# Patient Record
Sex: FEMALE | Race: WHITE | Employment: UNEMPLOYED | URBAN - METROPOLITAN AREA
[De-identification: names, ages, dates, MRNs, and addresses within clinical notes are randomized per-mention and may not be internally consistent; named-entity substitution may affect disease eponyms.]

---

## 2018-02-05 ENCOUNTER — OFFICE VISIT (OUTPATIENT)
Dept: FAMILY MEDICINE CLINIC | Age: 53
End: 2018-02-05

## 2018-02-05 VITALS
HEART RATE: 74 BPM | TEMPERATURE: 98.2 F | DIASTOLIC BLOOD PRESSURE: 89 MMHG | WEIGHT: 156 LBS | HEIGHT: 63 IN | BODY MASS INDEX: 27.64 KG/M2 | SYSTOLIC BLOOD PRESSURE: 139 MMHG

## 2018-02-05 DIAGNOSIS — Z12.11 SCREENING FOR COLON CANCER: ICD-10-CM

## 2018-02-05 DIAGNOSIS — Z23 NEED FOR TDAP VACCINATION: ICD-10-CM

## 2018-02-05 DIAGNOSIS — Z12.31 ENCOUNTER FOR SCREENING MAMMOGRAM FOR BREAST CANCER: ICD-10-CM

## 2018-02-05 DIAGNOSIS — R55 VASOVAGAL NEAR-SYNCOPE: Primary | ICD-10-CM

## 2018-02-05 PROCEDURE — 99213 OFFICE O/P EST LOW 20 MIN: CPT | Performed by: NURSE PRACTITIONER

## 2018-02-05 PROCEDURE — 90471 IMMUNIZATION ADMIN: CPT | Performed by: NURSE PRACTITIONER

## 2018-02-05 PROCEDURE — 90715 TDAP VACCINE 7 YRS/> IM: CPT | Performed by: NURSE PRACTITIONER

## 2018-02-05 ASSESSMENT — PATIENT HEALTH QUESTIONNAIRE - PHQ9
2. FEELING DOWN, DEPRESSED OR HOPELESS: 0
SUM OF ALL RESPONSES TO PHQ QUESTIONS 1-9: 0
1. LITTLE INTEREST OR PLEASURE IN DOING THINGS: 0
SUM OF ALL RESPONSES TO PHQ9 QUESTIONS 1 & 2: 0

## 2018-02-05 ASSESSMENT — ENCOUNTER SYMPTOMS: RESPIRATORY NEGATIVE: 1

## 2018-02-05 NOTE — PROGRESS NOTES
Subjective:      Chief Complaint   Patient presents with    Established New Doctor    Other     needs order for Colonoscopy       Patient ID: Kamila Black is a 50 y.o. female who presents to be estabilished as a new pt. Denies any problems other than an ED visit 12/6/ for a vasovagal syncopal event. Patient was on her period when she experienced severe cramping and bleeding she felt like she was going to have a bowel movement when she called out to her  before she passed out. She tells me she was out she could hear people talking to her but she could not respond to them. Loss of consciousness was less than a minute states her  she has denied any history of other syncopal events before that time denies any heart disease or anemia. The ED decided that she had experienced a vasovagal syncopal event associated with menses and defecation. She was discharged home in stable condition. She states her periods today are heavier and have more pain she believes it is due to her lack of exercise. She is now continuing her exercise program and feels this will help her menses. Loss of Consciousness   This is a new problem. The current episode started more than 1 month ago. The problem occurs rarely. The problem has been resolved. She lost consciousness for a period of less than 1 minute. The symptoms are aggravated by defecation. Associated symptoms include headaches (History of migraines takes Excedrinmigraine). Treatments tried: to the ED. The treatment provided significant relief. There is no history of arrhythmia, CAD, a clotting disorder, CVA, DM, HTN, seizures, a sudden death in family, TIA or vertigo.        Family History   Problem Relation Age of Onset    High Blood Pressure Mother     Arthritis Mother     Cancer Maternal Grandmother      colon       Social History     Social History    Marital status:      Spouse name: N/A    Number of children: N/A    Years of education: N/A Occupational History    Not on file. Social History Main Topics    Smoking status: Never Smoker    Smokeless tobacco: Never Used    Alcohol use No      Comment: a couple times a month    Drug use: No    Sexual activity: Yes     Other Topics Concern    Not on file     Social History Narrative    No narrative on file       Current Outpatient Prescriptions on File Prior to Visit   Medication Sig Dispense Refill    aspirin-acetaminophen-caffeine (EXCEDRIN MIGRAINE) 250-250-65 MG per tablet Take 1 tablet by mouth every 6 hours as needed for Headaches       No current facility-administered medications on file prior to visit. Review of Systems   Respiratory: Negative. Cardiovascular: Positive for syncope. Genitourinary:        Patient still on. Which are heavier and more painful   Musculoskeletal: Negative. Neurological: Positive for headaches (History of migraines takes Excedrinmigraine). Near syncopal event, vasovagal syncope she experienced this only once   Psychiatric/Behavioral: Negative. Objective:     Physical Exam   Constitutional: She is oriented to person, place, and time. She appears well-developed and well-nourished. HENT:   Head: Normocephalic and atraumatic. Right Ear: External ear normal.   Left Ear: External ear normal.   Nose: Nose normal.   Mouth/Throat: Oropharynx is clear and moist.   Eyes: Conjunctivae and EOM are normal. Pupils are equal, round, and reactive to light. Neck: Normal range of motion. Neck supple. Cardiovascular: Regular rhythm and normal heart sounds. Exam reveals no gallop and no friction rub. No murmur heard. Pulmonary/Chest: Effort normal and breath sounds normal. She has no wheezes. She has no rales. Abdominal: Soft. Bowel sounds are normal.   Musculoskeletal: Normal range of motion. Neurological: She is alert and oriented to person, place, and time. She displays normal reflexes.  No cranial nerve deficit or sensory deficit. She exhibits normal muscle tone. Coordination normal.   Skin: Skin is warm and dry. Capillary refill takes 2 to 3 seconds. Psychiatric: She has a normal mood and affect. Her behavior is normal. Judgment and thought content normal.       Assessment:       ICD-10-CM ICD-9-CM    1. Vasovagal near-syncope R55 780.2    2. Encounter for screening mammogram for breast cancer Z12.31 V76.12 KEVIN Digital Screen Bilateral [[id number]]   3. Screening for colon cancer Z12.11 V76.51 POCT FECAL IMMUNOCHEMICAL TEST (FIT)   4.  Need for Tdap vaccination Z23 V06.1 Tdap (age 6y and older) IM (Boostrix)         Plan:     Health maintenance reviewed, she will defer her colonoscopy at this time until later this year however she will take home a fit tests today  Call if any problems arise otherwise I will see her in year for physical  Stay well hydrated  Call if another syncopal event occurs

## 2018-02-08 ENCOUNTER — HOSPITAL ENCOUNTER (OUTPATIENT)
Dept: MAMMOGRAPHY | Age: 53
Discharge: HOME | End: 2018-02-08
Admitting: FAMILY MEDICINE

## 2018-02-08 DIAGNOSIS — Z12.31 ENCOUNTER FOR SCREENING MAMMOGRAM FOR BREAST CANCER: ICD-10-CM

## 2018-02-12 ENCOUNTER — OFFICE VISIT (OUTPATIENT)
Dept: FAMILY MEDICINE CLINIC | Age: 53
End: 2018-02-12

## 2018-02-12 VITALS
OXYGEN SATURATION: 96 % | BODY MASS INDEX: 26.73 KG/M2 | SYSTOLIC BLOOD PRESSURE: 126 MMHG | RESPIRATION RATE: 16 BRPM | TEMPERATURE: 98.5 F | WEIGHT: 156.6 LBS | HEART RATE: 79 BPM | DIASTOLIC BLOOD PRESSURE: 80 MMHG | HEIGHT: 64 IN

## 2018-02-12 DIAGNOSIS — Z12.4 SCREENING FOR CERVICAL CANCER: ICD-10-CM

## 2018-02-12 DIAGNOSIS — R92.8 ABNORMAL SCREENING MAMMOGRAM: ICD-10-CM

## 2018-02-12 DIAGNOSIS — Z01.419 WELL WOMAN EXAM: Primary | ICD-10-CM

## 2018-02-12 PROCEDURE — 99396 PREV VISIT EST AGE 40-64: CPT | Performed by: NURSE PRACTITIONER

## 2018-02-12 ASSESSMENT — ENCOUNTER SYMPTOMS
COLOR CHANGE: 0
EYE REDNESS: 0
TROUBLE SWALLOWING: 0
CONSTIPATION: 0
CHEST TIGHTNESS: 0
SHORTNESS OF BREATH: 0
ABDOMINAL PAIN: 0
NAUSEA: 0
WHEEZING: 0
SORE THROAT: 0
SINUS PRESSURE: 0
EYE ITCHING: 0
DIARRHEA: 0
COUGH: 0

## 2018-02-13 DIAGNOSIS — Z01.419 WELL WOMAN EXAM: ICD-10-CM

## 2018-02-13 LAB
CHOLESTEROL, TOTAL: 240 MG/DL (ref 0–199)
HDLC SERPL-MCNC: 58 MG/DL (ref 40–60)
LDL CHOLESTEROL CALCULATED: 166 MG/DL
TRIGL SERPL-MCNC: 81 MG/DL (ref 0–150)
VLDLC SERPL CALC-MCNC: 16 MG/DL

## 2018-02-14 LAB
ESTIMATED AVERAGE GLUCOSE: 108.3 MG/DL
HBA1C MFR BLD: 5.4 %

## 2018-02-15 ENCOUNTER — HOSPITAL ENCOUNTER (OUTPATIENT)
Dept: MAMMOGRAPHY | Age: 53
Discharge: HOME | End: 2018-02-16
Admitting: NURSE PRACTITIONER

## 2018-02-15 ENCOUNTER — HOSPITAL ENCOUNTER (OUTPATIENT)
Dept: ULTRASOUND IMAGING | Age: 53
Discharge: HOME | End: 2018-02-15

## 2018-02-15 DIAGNOSIS — R92.1 BREAST CALCIFICATIONS: ICD-10-CM

## 2018-02-15 DIAGNOSIS — R92.8 ABNORMAL SCREENING MAMMOGRAM: ICD-10-CM

## 2018-02-16 ENCOUNTER — TELEPHONE (OUTPATIENT)
Dept: FAMILY MEDICINE CLINIC | Age: 53
End: 2018-02-16

## 2018-02-16 DIAGNOSIS — N60.02 BREAST CYST, LEFT: Primary | ICD-10-CM

## 2018-02-21 ENCOUNTER — TELEPHONE (OUTPATIENT)
Dept: FAMILY MEDICINE CLINIC | Age: 53
End: 2018-02-21

## 2018-02-21 DIAGNOSIS — N60.02 BREAST CYST, LEFT: Primary | ICD-10-CM

## 2018-02-21 NOTE — TELEPHONE ENCOUNTER
Aida Frances stated that she needs an order put in for. Left stereo tactic breast Biopsy, Instead of the us guided please fix and reorder before Friday feb. 2/3/2018.

## 2018-02-23 ENCOUNTER — CASE MANAGEMENT (OUTPATIENT)
Dept: CASE MANAGEMENT | Age: 53
End: 2018-02-23

## 2018-02-23 ENCOUNTER — HOSPITAL ENCOUNTER (OUTPATIENT)
Dept: MAMMOGRAPHY | Age: 53
Discharge: HOME | End: 2018-02-23
Attending: NURSE PRACTITIONER | Admitting: NURSE PRACTITIONER

## 2018-02-23 DIAGNOSIS — R92.0 ABNORMAL FINDING ON MAMMOGRAPHY, MICROCALCIFICATION: ICD-10-CM

## 2018-02-23 NOTE — PROGRESS NOTES
Patient in [facility] for breast biopsy. Radiologist reviews procedure with patient, consent signed. Patient tolerates procedure well. Compression held. Site cleansed with chloraprep, steri strips and dry dressing applied. Ice pack in place. Reviewed discharge instructions with patient. Patient verbalizes understanding and agrees to contact Nurse Navigator with any questions. Patient A&Ox3 and steady on feet and discharged to waiting area. [facility] and Discharge [address]   [address] [facility] [address] [facility], [address]  Telephone: [phone]    NAME:  Adia Valle OF BIRTH:  1965  GENDER: female  MEDICAL RECORD NUMBER:  [id number]  TODAY'S DATE:  2/23/2018    [facility]    Post Breast Biopsy Instructions    [x] Place a cold pack inside your bra on top of the dressing for at least 3 hours, removing it every 15 minutes for 15 minutes after your biopsy. [x] Do not take Aspirin the day of your biopsy. [x] Your physician has instructed you to take Tylenol (Acetaminophen) the day of your biopsy for any discomfort. [x] Watch for excessive bleeding. If bleeding occurs apply pressure to site. Watch for signs of infection, increased pain, redness, swelling and heat. If this occurs call your physician. [x] Do not participate in any strenuous exercise for 48 hours after your biopsy, such as tennis, aerobics, weight lifting and household activities that involve use of your affected   side. [x] You may remove your outer dressing in 24 hours and you may shower. \"Steri-strips\" tape will stay on for 5-7 days. [facility] [place] Information:   Should you experience any significant changes in your health or have questions about your care, please contact:  [address] [phone]  Monday-Friday.   If you need help with your care outside these hours and cannot wait until we

## 2018-02-27 ENCOUNTER — TELEPHONE (OUTPATIENT)
Dept: FAMILY MEDICINE CLINIC | Age: 53
End: 2018-02-27

## 2018-02-27 NOTE — TELEPHONE ENCOUNTER
Patient needs to speak with andi regarding biopsy results and needs to know what to do next patient needs to make apt for a surgeon but needs a referral please call patient at [phone]

## 2018-02-28 ENCOUNTER — TELEPHONE (OUTPATIENT)
Dept: FAMILY MEDICINE CLINIC | Age: 53
End: 2018-02-28

## 2018-02-28 DIAGNOSIS — N60.92 ATYPICAL DUCTAL HYPERPLASIA OF LEFT BREAST: Primary | ICD-10-CM

## 2018-02-28 NOTE — TELEPHONE ENCOUNTER
Patient called needs a referral to Dr. Garrett Kelley for Left Atypical Ductal Hyperplasia that needs surgery right away. I did the  referral and gave patient info to Dr. Tere Porter.

## 2018-07-17 ENCOUNTER — OFFICE VISIT (OUTPATIENT)
Dept: FAMILY MEDICINE CLINIC | Age: 53
End: 2018-07-17

## 2018-07-17 VITALS
SYSTOLIC BLOOD PRESSURE: 128 MMHG | WEIGHT: 158 LBS | TEMPERATURE: 98.3 F | HEART RATE: 75 BPM | DIASTOLIC BLOOD PRESSURE: 86 MMHG | RESPIRATION RATE: 16 BRPM | BODY MASS INDEX: 27.12 KG/M2

## 2018-07-17 DIAGNOSIS — H60.331 ACUTE SWIMMER'S EAR OF RIGHT SIDE: ICD-10-CM

## 2018-07-17 DIAGNOSIS — R09.82 PND (POST-NASAL DRIP): Primary | ICD-10-CM

## 2018-07-17 PROCEDURE — 99214 OFFICE O/P EST MOD 30 MIN: CPT | Performed by: NURSE PRACTITIONER

## 2018-07-17 RX ORDER — CIPROFLOXACIN AND DEXAMETHASONE 3; 1 MG/ML; MG/ML
4 SUSPENSION/ DROPS AURICULAR (OTIC) 2 TIMES DAILY
Qty: 1 BOTTLE | Refills: 0 | Status: SHIPPED | OUTPATIENT
Start: 2018-07-17 | End: 2018-07-24

## 2018-07-17 ASSESSMENT — ENCOUNTER SYMPTOMS
DIARRHEA: 0
ABDOMINAL PAIN: 0
CONSTIPATION: 0
HEARTBURN: 0
BLOOD IN STOOL: 0
NAUSEA: 0
RHINORRHEA: 1
RESPIRATORY NEGATIVE: 1

## 2018-07-17 NOTE — PROGRESS NOTES
friction rub. No murmur heard. Pulmonary/Chest: Effort normal and breath sounds normal. She has no wheezes. She has no rales. Abdominal: Soft. Musculoskeletal: Normal range of motion. Neurological: She is alert and oriented to person, place, and time. Skin: Skin is warm and dry. Capillary refill takes less than 2 seconds. Psychiatric: She has a normal mood and affect. Her behavior is normal. Judgment and thought content normal.   Nursing note and vitals reviewed.       Assessment:     Otitis externa  PND    Plan:     Ciprodex 2 gtts to right ear for 7 days  Call on Friday if no improvement  Take OTC antihistamines

## 2018-09-19 ENCOUNTER — HOSPITAL ENCOUNTER (OUTPATIENT)
Dept: WOMENS IMAGING | Age: 53
Discharge: HOME | End: 2018-09-19
Payer: OTHER GOVERNMENT

## 2018-09-19 DIAGNOSIS — D05.90 CARCINOMA IN SITU OF BREAST, UNSPECIFIED LATERALITY, UNSPECIFIED TYPE: ICD-10-CM

## 2018-09-19 PROCEDURE — G0279 TOMOSYNTHESIS, MAMMO: HCPCS

## 2019-03-27 ENCOUNTER — HOSPITAL ENCOUNTER (OUTPATIENT)
Dept: WOMENS IMAGING | Age: 54
Discharge: HOME | End: 2019-03-27
Payer: OTHER GOVERNMENT

## 2019-03-27 DIAGNOSIS — D05.12 DUCTAL CARCINOMA IN SITU (DCIS) OF LEFT BREAST: ICD-10-CM

## 2019-03-27 PROCEDURE — G0279 TOMOSYNTHESIS, MAMMO: HCPCS

## 2019-06-17 ENCOUNTER — TELEPHONE (OUTPATIENT)
Dept: FAMILY MEDICINE CLINIC | Age: 54
End: 2019-06-17

## 2019-06-17 NOTE — TELEPHONE ENCOUNTER
Pt called asking if she could switch from Fortunato Herrera to SYED Gregory. Please call back and advise.

## 2020-02-14 ENCOUNTER — OFFICE VISIT (OUTPATIENT)
Dept: FAMILY MEDICINE CLINIC | Age: 55
End: 2020-02-14
Payer: OTHER GOVERNMENT

## 2020-02-14 VITALS
DIASTOLIC BLOOD PRESSURE: 94 MMHG | BODY MASS INDEX: 28.34 KG/M2 | OXYGEN SATURATION: 97 % | RESPIRATION RATE: 18 BRPM | SYSTOLIC BLOOD PRESSURE: 134 MMHG | WEIGHT: 166 LBS | HEART RATE: 81 BPM | HEIGHT: 64 IN

## 2020-02-14 PROBLEM — N60.92 BENIGN MAMMARY DYSPLASIA OF LEFT BREAST: Status: ACTIVE | Noted: 2018-03-13

## 2020-02-14 PROCEDURE — 99213 OFFICE O/P EST LOW 20 MIN: CPT | Performed by: NURSE PRACTITIONER

## 2020-02-14 ASSESSMENT — PATIENT HEALTH QUESTIONNAIRE - PHQ9
2. FEELING DOWN, DEPRESSED OR HOPELESS: 0
1. LITTLE INTEREST OR PLEASURE IN DOING THINGS: 0
SUM OF ALL RESPONSES TO PHQ QUESTIONS 1-9: 0
SUM OF ALL RESPONSES TO PHQ QUESTIONS 1-9: 0
SUM OF ALL RESPONSES TO PHQ9 QUESTIONS 1 & 2: 0

## 2020-02-14 ASSESSMENT — ENCOUNTER SYMPTOMS
GASTROINTESTINAL NEGATIVE: 1
WHEEZING: 0
CHEST TIGHTNESS: 0
CHOKING: 0
SHORTNESS OF BREATH: 0

## 2020-02-14 NOTE — PROGRESS NOTES
2/14/2020    This is a 48 y.o. female   Chief Complaint   Patient presents with    Colonoscopy     pt wants to discuss getting a Colonoscopy. She states she knows it is time to get one. Bernice Gonzales is here to discuss colon cancer screening. She has a remote history in her maternal grandmother of colon cancer after the age of 61. There are no other family history of colon cancer. She is wanting to discuss options regarding colonoscopy , fit test or Cologuard. Elevated blood pressure: She attributes this to a mild headache today. She states she does not check her blood pressure at home but has not been having dizziness, chest pain or palpitations. No lower leg edema. She states when she went to the doctor/dentist her blood pressure was normal.       Patient Active Problem List   Diagnosis    Migraine with aura and without status migrainosus, not intractable    Benign mammary dysplasia of left breast       Current Outpatient Medications   Medication Sig Dispense Refill    Multiple Vitamins-Minerals (MULTIVITAMIN ADULT PO) Take 1 tablet by mouth daily      aspirin-acetaminophen-caffeine (EXCEDRIN MIGRAINE) 250-250-65 MG per tablet Take 1 tablet by mouth every 6 hours as needed for Headaches       No current facility-administered medications for this visit. No Known Allergies    BP (!) 134/94 (Site: Left Upper Arm, Position: Sitting)   Pulse 81   Resp 18   Ht 5' 4\" (1.626 m)   Wt 166 lb (75.3 kg)   LMP 02/12/2020 (Exact Date)   SpO2 97%   Breastfeeding No   BMI 28.49 kg/m²     Social History     Tobacco Use    Smoking status: Never Smoker    Smokeless tobacco: Never Used   Substance Use Topics    Alcohol use: No     Alcohol/week: 0.0 standard drinks     Comment: a couple times a month       Review of Systems   Constitutional: Negative. HENT: Negative. Respiratory: Negative for choking, chest tightness, shortness of breath and wheezing.     Cardiovascular: Negative for chest pain, palpitations and leg swelling. Gastrointestinal: Negative. Genitourinary:        Mammogram done 3/2019- Dr Wili queen   Musculoskeletal: Negative. Skin: Negative. Neurological: Positive for headaches (Chronic, stable). Psychiatric/Behavioral: Negative. Physical Exam  Vitals signs and nursing note reviewed. Constitutional:       General: She is not in acute distress. Appearance: She is well-developed. She is not diaphoretic. HENT:      Head: Normocephalic and atraumatic. Right Ear: External ear normal.      Left Ear: External ear normal.      Nose: Nose normal.      Mouth/Throat:      Pharynx: No oropharyngeal exudate. Eyes:      General:         Right eye: No discharge. Left eye: No discharge. Conjunctiva/sclera: Conjunctivae normal.   Neck:      Musculoskeletal: Normal range of motion and neck supple. Cardiovascular:      Rate and Rhythm: Normal rate and regular rhythm. Heart sounds: Normal heart sounds. No murmur. No friction rub. No gallop. Pulmonary:      Effort: Pulmonary effort is normal. No respiratory distress. Breath sounds: Normal breath sounds. No wheezing or rales. Abdominal:      General: Bowel sounds are normal. There is no distension. Palpations: Abdomen is soft. Tenderness: There is no abdominal tenderness. Musculoskeletal: Normal range of motion. General: No tenderness. Lymphadenopathy:      Cervical: No cervical adenopathy. Skin:     General: Skin is warm and dry. Coloration: Skin is not pale. Findings: No erythema or rash. Neurological:      Mental Status: She is alert and oriented to person, place, and time. Motor: No abnormal muscle tone. Coordination: Coordination normal.   Psychiatric:         Behavior: Behavior normal.         Thought Content: Thought content normal.         Judgment: Judgment normal.         Diagnosis       ICD-10-CM    1.  Elevated blood pressure reading R03.0    2. Screen for colon cancer Z12.11 Drea Tolliver MD, Gastroenterology, [facility]        Plan    Check blood pressures at home  Curt due in March  Referral for colonoscopy baseline with Dr. Mathew Bruno  Return in 1 month for complete physical with fasting blood work prior, bring copy of blood pressure readings    Orders Placed This Encounter   Procedures   Drea Tolliver MD, Gastroenterology, [facility]     Referral Priority:   Routine     Referral Type:   Eval and Treat     Referral Reason:   Specialty Services Required     Referred to Provider:   Gerhardt Dolphin, MD     Requested Specialty:   Gastroenterology     Number of Visits Requested:   1       No orders of the defined types were placed in this encounter.       Patient Education:  Plan    Return in about 4 weeks (around 3/13/2020) for annual physical.

## 2020-02-18 RX ORDER — POLYETHYLENE GLYCOL 3350 17 G/17G
POWDER, FOR SOLUTION ORAL
Qty: 238 G | Refills: 0 | Status: SHIPPED | OUTPATIENT
Start: 2020-02-18

## 2020-03-02 ENCOUNTER — TELEPHONE (OUTPATIENT)
Dept: GASTROENTEROLOGY | Age: 55
End: 2020-03-02

## 2020-03-02 ENCOUNTER — TELEPHONE (OUTPATIENT)
Dept: FAMILY MEDICINE CLINIC | Age: 55
End: 2020-03-02

## 2020-03-02 NOTE — TELEPHONE ENCOUNTER
Patient scheduled for colon 3/5/20. Patient just found out our doctors and facility is not in network. Colon cancelled. [facility] updated. Endo informed.

## 2021-03-24 ENCOUNTER — IMMUNIZATION (OUTPATIENT)
Dept: PRIMARY CARE CLINIC | Age: 56
End: 2021-03-24
Payer: OTHER GOVERNMENT

## 2021-03-24 PROCEDURE — 91301 COVID-19, MODERNA VACCINE 100MCG/0.5ML DOSE: CPT

## 2021-03-24 PROCEDURE — 0011A COVID-19, MODERNA VACCINE 100MCG/0.5ML DOSE: CPT

## 2021-04-21 ENCOUNTER — IMMUNIZATION (OUTPATIENT)
Dept: PRIMARY CARE CLINIC | Age: 56
End: 2021-04-21
Payer: OTHER GOVERNMENT

## 2021-04-21 PROCEDURE — 91301 COVID-19, MODERNA VACCINE 100MCG/0.5ML DOSE: CPT

## 2021-04-21 PROCEDURE — 0012A COVID-19, MODERNA VACCINE 100MCG/0.5ML DOSE: CPT
